# Patient Record
Sex: MALE | Race: WHITE | NOT HISPANIC OR LATINO | Employment: OTHER | URBAN - METROPOLITAN AREA
[De-identification: names, ages, dates, MRNs, and addresses within clinical notes are randomized per-mention and may not be internally consistent; named-entity substitution may affect disease eponyms.]

---

## 2017-01-03 DIAGNOSIS — Z72.0 NICOTINE ABUSE: Primary | ICD-10-CM

## 2017-01-03 RX ORDER — NICOTINE 21 MG/24HR
1 PATCH, TRANSDERMAL 24 HOURS TRANSDERMAL EVERY 24 HOURS
Qty: 30 PATCH | Refills: 0 | Status: SHIPPED | OUTPATIENT
Start: 2017-01-03 | End: 2021-05-26

## 2017-01-03 RX ORDER — NICOTINE 21 MG/24HR
1 PATCH, TRANSDERMAL 24 HOURS TRANSDERMAL EVERY 24 HOURS
Qty: 14 PATCH | Refills: 0 | Status: SHIPPED | OUTPATIENT
Start: 2017-01-03 | End: 2021-05-26

## 2019-05-21 ENCOUNTER — TRANSFERRED RECORDS (OUTPATIENT)
Dept: HEALTH INFORMATION MANAGEMENT | Facility: CLINIC | Age: 39
End: 2019-05-21

## 2019-05-21 LAB
CREAT SERPL-MCNC: 0.92 MG/DL (ref 0.72–1.25)
GFR SERPL CREATININE-BSD FRML MDRD: >60 ML/MIN/1.73M2
GLUCOSE SERPL-MCNC: 93 MG/DL (ref 70–105)
POTASSIUM SERPL-SCNC: 3.8 MMOL/L (ref 3.5–5.1)

## 2019-07-08 ENCOUNTER — TELEPHONE (OUTPATIENT)
Dept: ORTHOPEDICS | Facility: OTHER | Age: 39
End: 2019-07-08

## 2019-07-08 ENCOUNTER — OFFICE VISIT (OUTPATIENT)
Dept: ORTHOPEDICS | Facility: OTHER | Age: 39
End: 2019-07-08

## 2019-07-08 VITALS
BODY MASS INDEX: 24.41 KG/M2 | SYSTOLIC BLOOD PRESSURE: 104 MMHG | WEIGHT: 146.5 LBS | DIASTOLIC BLOOD PRESSURE: 56 MMHG | HEIGHT: 65 IN

## 2019-07-08 DIAGNOSIS — Z87.39 HISTORY OF CLOSED SHOULDER DISLOCATION: Primary | ICD-10-CM

## 2019-07-08 DIAGNOSIS — S43.432A TEAR OF LEFT GLENOID LABRUM, INITIAL ENCOUNTER: ICD-10-CM

## 2019-07-08 DIAGNOSIS — S42.292A HILL-SACHS LESION OF LEFT SHOULDER: ICD-10-CM

## 2019-07-08 PROCEDURE — 99204 OFFICE O/P NEW MOD 45 MIN: CPT | Performed by: PHYSICAL MEDICINE & REHABILITATION

## 2019-07-08 ASSESSMENT — MIFFLIN-ST. JEOR: SCORE: 1503.27

## 2019-07-08 NOTE — PROGRESS NOTES
Sports Medicine Clinic Visit    PCP: Kb Brown    CC: Patient presents with:  Left Shoulder - Pain      HPI:  Quirino Torres is a 39 year old male who is seen as a self referral.   He notes left shoulder pain that began 3-3.5 weeks ago when he was lifting bins out of his truck and putting them on shelving in his garage.  He felt his shoulder popped out.  He notes it locked up on him and had excruciating pain. He was seen in [place] the day of the injury in the ED.  He notes pain in the anterior shoulder, posterior shoulder, and deep in the shoulder.  He notes it has popped out multiple times since the injury.  He rates the pain at a 10/10 at its worst and a 0/10 currently.  Symptoms are relieved with activity avoidance. Symptoms are worsened by certain positions, reaching, and lifting. He endorses clicking, and instability.   He denies swelling, bruising, grinding, catching, locking, numbness and tingling.  Other treatment has included rest, ibuprofen.       Review of Systems:  Musculoskeletal: as above  Remainder of review of systems is negative including constitutional, eyes, ENT, CV, pulmonary, GI, , endocrine, skin, hematologic, and neurologic except as noted in HPI or medical history.    History reviewed. No pertinent past surgical/medical/family/social history other than as mentioned in HPI.    Patient Active Problem List   Diagnosis     Family history of cardiac arrest     Nicotine abuse     CARDIOVASCULAR SCREENING; LDL GOAL LESS THAN 160     No past medical history on file.  No past surgical history on file.  Family History   Problem Relation Age of Onset     Cardiovascular Father 49        Dad passed away due to a massive heart attack     Cardiovascular Maternal Grandmother      Cardiovascular Paternal Grandmother      Diabetes Maternal Grandfather      Social History     Socioeconomic History     Marital status: Single     Spouse name: Not on file     Number of children: Not on file      Years of education: Not on file     Highest education level: Not on file   Occupational History     Not on file   Social Needs     Financial resource strain: Not on file     Food insecurity:     Worry: Not on file     Inability: Not on file     Transportation needs:     Medical: Not on file     Non-medical: Not on file   Tobacco Use     Smoking status: Current Every Day Smoker     Packs/day: 1.00     Years: 20.00     Pack years: 20.00     Types: Cigarettes     Smokeless tobacco: Never Used   Substance and Sexual Activity     Alcohol use: No     Drug use: Yes     Comment: MARIJUANA     Sexual activity: Never   Lifestyle     Physical activity:     Days per week: Not on file     Minutes per session: Not on file     Stress: Not on file   Relationships     Social connections:     Talks on phone: Not on file     Gets together: Not on file     Attends Latter-day service: Not on file     Active member of club or organization: Not on file     Attends meetings of clubs or organizations: Not on file     Relationship status: Not on file     Intimate partner violence:     Fear of current or ex partner: Not on file     Emotionally abused: Not on file     Physically abused: Not on file     Forced sexual activity: Not on file   Other Topics Concern      Service No     Blood Transfusions No     Caffeine Concern No     Occupational Exposure No     Hobby Hazards No     Sleep Concern No     Stress Concern No     Weight Concern No     Special Diet No     Back Care Yes     Comment: Lower back pain due to job     Exercise No     Bike Helmet Yes     Comment: Motorcycle yes-pedal bike- no     Seat Belt Yes     Self-Exams Yes     Parent/sibling w/ CABG, MI or angioplasty before 65F 55M? Not Asked   Social History Narrative     Not on file       He works doing [employer] and [employer]. He owns his own company.     Current Outpatient Medications   Medication     meloxicam (MOBIC) 15 MG tablet     nicotine (NICODERM CQ) 14 MG/24HR 24 hr  "patch     nicotine (NICODERM CQ) 21 MG/24HR 24 hr patch     nicotine (NICODERM CQ) 7 MG/24HR 24 hr patch     NO ACTIVE MEDICATIONS     No current facility-administered medications for this visit.      Allergies   Allergen Reactions     No Known Drug Allergies          Objective:  /56   Ht 1.646 m (5' 4.8\")   Wt 66.5 kg (146 lb 8 oz)   BMI 24.53 kg/m      General: Alert and in no distress    Head: Normocephalic, atraumatic  Eyes: no scleral icterus or conjunctival erythema   Oropharynx:  Mucous membranes moist  Skin: no erythema, petechiae, or jaundice  CV: regular rhythm by palpation, 2+ distal pulses  Resp: normal respiratory effort without conversational dyspnea   Psych: normal mood and affect    Gait: Non-antalgic, appropriate coordination and balance   Neuro: Motor strength and sensation as noted below    Musculoskeletal:    Bilateral Shoulder exam    Inspection and Posture:       normal    Skin:        no visible deformities    Palpation:  -Tender over the left proximal biceps tendon insertion    ROM:        Full active ROM with flexion, extension, abduction, adduction, internal and external rotation bilaterally    Painful motions:  -Left shoulder abduction, flexion, external rotation, and internal rotation behind the back are painful    Strength:        shoulder shrug 5/5 bilaterally       shoulder abduction 5/5 bilaterally       shoulder flexion 5/5 bilaterally       shoulder internal rotation 5/5 bilaterally       shoulder external rotation 5/5 bilaterally       elbow flexion 5/5 bilaterally       elbow extension 5/5 bilaterally       forearm pronation 5/5 bilaterally       forearm supination 5/5 bilaterally       wrist flexion 5/5 bilaterally       wrist extension 5/5 bilaterally        strength 5/5 bilaterally       finger abduction 5/5 bilaterally    Sensation:        normal sensation over shoulder and upper extremity       Radiology:  Images are being pushed to PACS  MRI (MDIP) SHOULDER LT " W/O CONTRAST6/27/2019  [facility]   Result Impression   IMPRESSION:     1.  Evidence of recent fracture dislocation of the glenohumeral joint. Slight displacement of bone Bankart fracture involving the anterior inferior rim of the glenoid.  2.  Hill-Sachs lesion with marrow edema.  3.  Articular surfaces otherwise remain congruent.  4.  Acromioclavicular joint edema.  5.  Joint effusion, small loose bodies in synovial thickening in the inferior recess of the joint.  6.  Abnormal appearance of the superior, anterior and posterior glenoid labrum suspicious for intrinsic labral tear.  7.  Orthopedic referral recommended.    REPORT SIGNED BY DR. Georgi Zaragoza   Result Narrative   EXAMINATION: MRI SHOULDER LT W/O CON  6/27/2019 12:25 PM    CLINICAL DATA: M25.512 Pain in left shoulder. Assess for rotator cuff tear. Impingement suspected. Injury placing boxes 2 weeks ago with shooting pain    TECHNIQUE: Multiplanar T1, proton-density, fat sat proton-density, and T2 of the shoulder.    COMPARISON: None    FINDINGS:    Rotator Cuff:     Mild distal supraspinatus tendinosis. Remaining tendons intact and unremarkable. No muscle atrophy or tendon retraction.    Acromioclavicular Joint :    Acromioclavicular joint edema and hypertrophy. Slightly downsloping lateral margin of the type I acromion.    Glenohumeral Joint:     There is evidence of recent dislocation with bony Bankart fracture at the anterior inferior glenoid measuring 14 x 6 mm with mild displacement. Superficial Hill-Sachs lesion with marrow edema. Articular surfaces remain congruent.    Biceps/Labral Complex: Mild bicipital tendinosis without dislocation. Abnormal signal within the posterior and anterior labrum. Joint effusion. Synovial thickening and small loose bodies in the inferior recess of the joint.    Periarticular Tissues:     No acute periarticular soft tissue abnormality.               Assessment:  1. History of closed shoulder dislocation     2. Hill-Sachs lesion of left shoulder    3. Tear of left glenoid labrum, initial encounter        Plan:  Discussed the assessment with the patient and developed a plan together:  -Referral to an Orthopedic Surgeon - Dr. Reema Sanders.  -Rehab: Physical Therapy: [facility] - [phone]. Please do 5-6 days of exercises per week between formal sessions and the home exercises they provide.  -Ice or ice massage 15-20 minutes for pain relief or swelling as needed  -Continue meloxicam and Flexeril as desired. Taking Flexeril may cause sedation. Do not take prior to driving.  Please take Mobic with food.  DO NOT take any other nonsteroidal anti-inflammatory drugs (NSAIDs) such as Advil, ibuprofen, Aleve, naproxen, etc while on this medication.  -Avoid aggravating activities    Follow Up: With Dr Sanders. Please call with any questions or concerns.       Maegan Jolley MD, CAQ Sports Medicine  [facility]

## 2019-07-08 NOTE — LETTER
7/8/2019         RE: Quirino Torres  [address]  [place] [address]        Dear Colleague,    Thank you for referring your patient, Quirino Torres, to the [facility]. Please see a copy of my visit note below.    Sports Medicine Clinic Visit    PCP: Kb Brown    CC: Patient presents with:  Left Shoulder - Pain      HPI:  Quirino Torres is a 39 year old male who is seen as a self referral.   He notes left shoulder pain that began 3-3.5 weeks ago when he was lifting bins out of his truck and putting them on shelving in his garage.  He felt his shoulder popped out.  He notes it locked up on him and had excruciating pain. He was seen in [place] the day of the injury in the ED.  He notes pain in the anterior shoulder, posterior shoulder, and deep in the shoulder.  He notes it has popped out multiple times since the injury.  He rates the pain at a 10/10 at its worst and a 0/10 currently.  Symptoms are relieved with activity avoidance. Symptoms are worsened by certain positions, reaching, and lifting. He endorses clicking, and instability.   He denies swelling, bruising, grinding, catching, locking, numbness and tingling.  Other treatment has included rest, ibuprofen.       Review of Systems:  Musculoskeletal: as above  Remainder of review of systems is negative including constitutional, eyes, ENT, CV, pulmonary, GI, , endocrine, skin, hematologic, and neurologic except as noted in HPI or medical history.    History reviewed. No pertinent past surgical/medical/family/social history other than as mentioned in HPI.    Patient Active Problem List   Diagnosis     Family history of cardiac arrest     Nicotine abuse     CARDIOVASCULAR SCREENING; LDL GOAL LESS THAN 160     No past medical history on file.  No past surgical history on file.  Family History   Problem Relation Age of Onset     Cardiovascular Father 49        Dad passed away due to a massive heart attack      Cardiovascular Maternal Grandmother      Cardiovascular Paternal Grandmother      Diabetes Maternal Grandfather      Social History     Socioeconomic History     Marital status: Single     Spouse name: Not on file     Number of children: Not on file     Years of education: Not on file     Highest education level: Not on file   Occupational History     Not on file   Social Needs     Financial resource strain: Not on file     Food insecurity:     Worry: Not on file     Inability: Not on file     Transportation needs:     Medical: Not on file     Non-medical: Not on file   Tobacco Use     Smoking status: Current Every Day Smoker     Packs/day: 1.00     Years: 20.00     Pack years: 20.00     Types: Cigarettes     Smokeless tobacco: Never Used   Substance and Sexual Activity     Alcohol use: No     Drug use: Yes     Comment: MARIJUANA     Sexual activity: Never   Lifestyle     Physical activity:     Days per week: Not on file     Minutes per session: Not on file     Stress: Not on file   Relationships     Social connections:     Talks on phone: Not on file     Gets together: Not on file     Attends Latter-day service: Not on file     Active member of club or organization: Not on file     Attends meetings of clubs or organizations: Not on file     Relationship status: Not on file     Intimate partner violence:     Fear of current or ex partner: Not on file     Emotionally abused: Not on file     Physically abused: Not on file     Forced sexual activity: Not on file   Other Topics Concern      Service No     Blood Transfusions No     Caffeine Concern No     Occupational Exposure No     Hobby Hazards No     Sleep Concern No     Stress Concern No     Weight Concern No     Special Diet No     Back Care Yes     Comment: Lower back pain due to job     Exercise No     Bike Helmet Yes     Comment: Motorcycle yes-pedal bike- no     Seat Belt Yes     Self-Exams Yes     Parent/sibling w/ CABG, MI or angioplasty before 65F  "55M? Not Asked   Social History Narrative     Not on file       He works doing [employer] and [employer]. He owns his own company.     Current Outpatient Medications   Medication     meloxicam (MOBIC) 15 MG tablet     nicotine (NICODERM CQ) 14 MG/24HR 24 hr patch     nicotine (NICODERM CQ) 21 MG/24HR 24 hr patch     nicotine (NICODERM CQ) 7 MG/24HR 24 hr patch     NO ACTIVE MEDICATIONS     No current facility-administered medications for this visit.      Allergies   Allergen Reactions     No Known Drug Allergies          Objective:  /56   Ht 1.646 m (5' 4.8\")   Wt 66.5 kg (146 lb 8 oz)   BMI 24.53 kg/m       General: Alert and in no distress    Head: Normocephalic, atraumatic  Eyes: no scleral icterus or conjunctival erythema   Oropharynx:  Mucous membranes moist  Skin: no erythema, petechiae, or jaundice  CV: regular rhythm by palpation, 2+ distal pulses  Resp: normal respiratory effort without conversational dyspnea   Psych: normal mood and affect    Gait: Non-antalgic, appropriate coordination and balance   Neuro: Motor strength and sensation as noted below    Musculoskeletal:    Bilateral Shoulder exam    Inspection and Posture:       normal    Skin:        no visible deformities    Palpation:  -Tender over the left proximal biceps tendon insertion    ROM:        Full active ROM with flexion, extension, abduction, adduction, internal and external rotation bilaterally    Painful motions:  -Left shoulder abduction, flexion, external rotation, and internal rotation behind the back are painful    Strength:        shoulder shrug 5/5 bilaterally       shoulder abduction 5/5 bilaterally       shoulder flexion 5/5 bilaterally       shoulder internal rotation 5/5 bilaterally       shoulder external rotation 5/5 bilaterally       elbow flexion 5/5 bilaterally       elbow extension 5/5 bilaterally       forearm pronation 5/5 bilaterally       forearm supination 5/5 bilaterally       wrist flexion 5/5 bilaterally     "   wrist extension 5/5 bilaterally        strength 5/5 bilaterally       finger abduction 5/5 bilaterally    Sensation:        normal sensation over shoulder and upper extremity       Radiology:  Images are being pushed to PACS  MRI (MD) SHOULDER LT W/O CONTRAST6/27/2019  [facility]   Result Impression   IMPRESSION:     1.  Evidence of recent fracture dislocation of the glenohumeral joint. Slight displacement of bone Bankart fracture involving the anterior inferior rim of the glenoid.  2.  Hill-Sachs lesion with marrow edema.  3.  Articular surfaces otherwise remain congruent.  4.  Acromioclavicular joint edema.  5.  Joint effusion, small loose bodies in synovial thickening in the inferior recess of the joint.  6.  Abnormal appearance of the superior, anterior and posterior glenoid labrum suspicious for intrinsic labral tear.  7.  Orthopedic referral recommended.    REPORT SIGNED BY DR. Georgi Zaragoza   Result Narrative   EXAMINATION: MRI SHOULDER LT W/O CON  6/27/2019 12:25 PM    CLINICAL DATA: M25.512 Pain in left shoulder. Assess for rotator cuff tear. Impingement suspected. Injury placing boxes 2 weeks ago with shooting pain    TECHNIQUE: Multiplanar T1, proton-density, fat sat proton-density, and T2 of the shoulder.    COMPARISON: None    FINDINGS:    Rotator Cuff:     Mild distal supraspinatus tendinosis. Remaining tendons intact and unremarkable. No muscle atrophy or tendon retraction.    Acromioclavicular Joint :    Acromioclavicular joint edema and hypertrophy. Slightly downsloping lateral margin of the type I acromion.    Glenohumeral Joint:     There is evidence of recent dislocation with bony Bankart fracture at the anterior inferior glenoid measuring 14 x 6 mm with mild displacement. Superficial Hill-Sachs lesion with marrow edema. Articular surfaces remain congruent.    Biceps/Labral Complex: Mild bicipital tendinosis without dislocation. Abnormal signal within the posterior and  anterior labrum. Joint effusion. Synovial thickening and small loose bodies in the inferior recess of the joint.    Periarticular Tissues:     No acute periarticular soft tissue abnormality.               Assessment:  1. History of closed shoulder dislocation    2. Hill-Sachs lesion of left shoulder    3. Tear of left glenoid labrum, initial encounter        Plan:  Discussed the assessment with the patient and developed a plan together:  -Referral to an Orthopedic Surgeon - Dr. Reema Sanders.  -Rehab: Physical Therapy: [facility] - [phone]. Please do 5-6 days of exercises per week between formal sessions and the home exercises they provide.  -Ice or ice massage 15-20 minutes for pain relief or swelling as needed  -Continue meloxicam and Flexeril as desired. Taking Flexeril may cause sedation. Do not take prior to driving.  Please take Mobic with food.  DO NOT take any other nonsteroidal anti-inflammatory drugs (NSAIDs) such as Advil, ibuprofen, Aleve, naproxen, etc while on this medication.  -Avoid aggravating activities    Follow Up: With Dr Sanders. Please call with any questions or concerns.       Maegan Jolley MD, [facility] Sports Medicine  [facility]      Again, thank you for allowing me to participate in the care of your patient.        Sincerely,        Shira Jolley MD

## 2019-07-08 NOTE — PATIENT INSTRUCTIONS
Today's Plan of Care:  -Referral to an Orthopedic Surgeon - Dr. Reema Sanders. Scheduling will call you.   -Rehab: Physical Therapy: [facility] - [phone]. Please do 5-6 days of exercises per week between formal sessions and the home exercises they provide.  -Ice or ice massage 15-20 minutes for pain relief or swelling as needed  -Continue meloxicam and Flexeril as desired. Cyclobenzaprine (Flexeril). Taking this medication may cause sedation. Do not take prior to driving  -Please take Mobic with food.  DO NOT take any other nonsteroidal anti-inflammatory drugs (NSAIDs) such as Advil, ibuprofen, Aleve, naproxen, etc while on this medication.  -Avoid aggravating activities      Follow Up: With Dr Sanders. Please call with any questions or concerns.

## 2020-02-18 ENCOUNTER — NURSE TRIAGE (OUTPATIENT)
Dept: NURSING | Facility: CLINIC | Age: 40
End: 2020-02-18

## 2020-02-18 NOTE — TELEPHONE ENCOUNTER
"Calling about weird feeling in chest x 2 weeks.    Chest pain is happening once per minute.  Deep inside.  It lasts a few seconds.    Sob recently.  No cough, cold or fever.    Worse the last 2 days.    Also weird stomach nausea at times.    \"Gut pains\".    No insurance currently. Needs to be seen today UC or ED.    Patient going to nearest urgent care in [place] since he is there now.    Yamila Monique RN  [facility] Nurse Advisor      Additional Information    Negative: Severe difficulty breathing (e.g., struggling for each breath, speaks in single words)    Negative: Passed out (i.e., fainted, collapsed and was not responding)    Negative: Chest pain lasting longer than 5 minutes and ANY of the following:* Over 50 years old* Over 30 years old and at least one cardiac risk factor (i.e., high blood pressure, diabetes, high cholesterol, obesity, smoker or strong family history of heart disease)* Pain is crushing, pressure-like, or heavy * Took nitroglycerin and chest pain was not relieved* History of heart disease (i.e., angina, heart attack, bypass surgery, angioplasty, CHF)    Negative: Visible sweat on face or sweat dripping down face    Negative: Sounds like a life-threatening emergency to the triager    Negative: Followed an injury to chest    Negative: SEVERE chest pain    Negative: Pain also present in shoulder(s) or arm(s) or jaw    Negative: Difficulty breathing    Negative: Cocaine use within last 3 days    Negative: History of prior 'blood clot' in leg or lungs (i.e., deep vein thrombosis, pulmonary embolism)    Negative: Recent illness requiring prolonged bed rest (i.e., immobilization)    Negative: Hip or leg fracture in past 2 months (e.g, or had cast on leg or ankle)    Negative: Major surgery in the past month    Negative: Recent long-distance travel with prolonged time in car, bus, plane, or train (i.e., within past 2 weeks; 6 or more hours duration)    Negative: Heart beating " irregularly or very rapidly    Intermittent chest pain and pain has been increasing in severity or frequency    Negative: Chest pain lasting longer than 5 minutes    Protocols used: CHEST PAIN-A-OH

## 2021-05-25 NOTE — PROGRESS NOTES
Assessment & Plan     Cigarette nicotine dependence without complication  Nicotine abuse  He is interested in quitting, he would like to try the patches first and use gum as needed. We did discuss Chantix and Wellbutrin but he wants to wait on this, if he wants to start we did already discuss potential side effects and will send in Rx if needed.   - nicotine (NICODERM CQ) 21 MG/24HR 24 hr patch; Place 1 patch onto the skin every 24 hours  - nicotine (NICODERM CQ) 14 MG/24HR 24 hr patch; Place 1 patch onto the skin every 24 hours  - nicotine (NICODERM CQ) 7 MG/24HR 24 hr patch; Place 1 patch onto the skin every 24 hours  - SMOKING CESSATION COUNSELING 3-10 MIN  - nicotine (NICORETTE) 2 MG gum; Place 1 each (2 mg) inside cheek as needed for smoking cessation Place 1 each inside cheek as needed for smoking cessation.    Acute right-sided low back pain without sciatica  This is a newer issue but going on months with what sounds like some improvement.  Recommended imaging to evaluate as we have not had x-rays in the clinic.  Because of his occupation encouraged physical therapy as well which he is open to. If persistent pain or pending x-ray results would consider MRI but there are no radicular or neurologic symptoms at this time.  Testicular examination was performed with no pain on exam, no masses and no herniations at this time. No significant acute pain at this time to suggest need for medication management.   - XR Lumbar Spine 2/3 Views; Future  - XR Sacroiliac Joint G/E 3 Views; Future  - [facility] PT AND HAND REFERRAL; Future    Chronic left shoulder pain  He notes this as we talked about MRI potentially of back and he wants an MRI of the left shoulder.  He notes instability, this may need to be used with dye of the shoulder joint for labral assessment.  Will reach out to ortho/sports med prior to ordering imaging, did get x-ray today.   - XR Shoulder Left G/E 3 Views; Future    Screening for HIV (human immunodeficiency  "virus)  He will do labs in future  - HIV Antigen Antibody Combo; Future    Need for hepatitis C screening test  He will do labs in the future.   - Hepatitis C Screen Reflex to HCV RNA Quant and Genotype; Future    Screening for hyperlipidemia  He will do labs in the future.   - Lipid panel reflex to direct LDL Fasting; Future      Tobacco Cessation:   reports that he has been smoking cigarettes. He has a 26.00 pack-year smoking history. He has never used smokeless tobacco.  Tobacco Cessation Action Plan: Pharmacotherapies : Nicotine patch and other Nicotine replacement        Return in about 4 weeks (around 6/23/2021) for If not improving, sooner if worse or new concerns.    Options for treatment and follow-up care were reviewed with the patient and/or guardian. Patient and/or guardian engaged in the decision making process and verbalized understanding of the options discussed and agreed with the final plan.    ANGEL Gonzalez [facility] JUAN Win is a 41 year old who presents for the following health issues     HPI   Would like to discuss quitting smoking. Smokes about a 1 pack to 1.5 packs a day.     Back Pain  Onset/Duration: 5 months  Description:   Location of pain: low back right  Character of pain: depending on movement it can be sharp, makes his right testicle hurt.   Pain radiation: none  New numbness or weakness in legs, not attributed to pain: no   Intensity: Currently 0/10, At its worst 8-9/10  Progression of Symptoms: same  History:   Specific cause: awhile back, there were a few nights were he was sleeping on his stomach. 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   Pain interferes with job: YES  History of back problems: no prior back problems  Any previous MRI or X-rays: Yes--at chiropractor. X-rays.   Sees a specialist for back pain: No  Alleviating factors:   Improved by: none    Precipitating factors:  Worsened by: \"certain " "movement\"  Therapies tried and outcome: chiropractor a few times-maybe 3 times, cannot get his lower back to \"pop\", has had a massage, a spacer to wear under his right foot due to hips being out of line, heating pad.     - consistently had pain for the last 5 months.   - Chiropractor gave spacer for shoe because hip was slightly out of line.   - Right sided low back pain. Sometimes will radiate to the groin.   - Massage therapist felt a difference on the right side low back compared to the left.   - only certain movements will cause pain - bending some, position changes.  - he denies any numbness/tingling sensations.   - Denies any testicular masses or palpable pain. He notes the testicular pain is only on the right side and has improved with time as well.   - He also has had left shoulder pain, used to have right shoulder pain but now in the left. He notes having what sounds like dislocations a few times in the past years ago and feels like it is loose.     Accompanying Signs & Symptoms:  Risk of Fracture: None  Risk of Cauda Equina: None  Risk of Infection: None  Risk of Cancer: None  Risk of Ankylosing Spondylitis: Onset at age <35, male, AND morning back stiffness  no     Quitting smoking:  - Has smoked since he was 12/13 years old.   - Quit when ever he would be in [employer] when he was younger.   - Did quit once for a week cold turkey.   - hasn't used any products to quit smoking.     Review of Systems   Constitutional, cardiovascular, pulmonary, musculoskeletal, neuro, skin systems are negative, except as otherwise noted.      Objective    /72   Pulse 70   Temp 97.9  F (36.6  C) (Temporal)   Resp 16   Ht 1.685 m (5' 6.34\")   Wt 69.1 kg (152 lb 6.4 oz)   SpO2 99%   BMI 24.35 kg/m    Body mass index is 24.35 kg/m .  Physical Exam   GENERAL: healthy, alert and no distress  RESP: lungs clear to auscultation - no rales, rhonchi or wheezes  CV: regular rate and rhythm, normal S1 S2, no S3 or S4, no murmur, " click or rub, no peripheral edema and peripheral pulses strong   (male): normal male genitalia without lesions or urethral discharge, no hernia  MS: no gross musculoskeletal defects noted, no edema  LUMBAR SPINE - non-tender to palpation over the lumbar spinous processes, mildly tender to deep palpation over the right SI joint and mildly over the right lumbar paraspinal muscles.  Full active ROM of the lumbar spine with discomfort noted with R>L lateral bending. Negative SLR bilaterally.     HIP - full passive ROM of bilateral hips without pain, noted pain in right SI joint region with EVELYNE on left, negative with EVELYNE on right.    SKIN: no suspicious lesions or rashes  NEURO: Normal strength and tone, mentation intact and speech normal  PSYCH: mentation appears normal, affect normal/bright    No results found for any visits on 05/26/21.

## 2021-05-26 ENCOUNTER — ANCILLARY PROCEDURE (OUTPATIENT)
Dept: GENERAL RADIOLOGY | Facility: OTHER | Age: 41
End: 2021-05-26
Attending: PHYSICIAN ASSISTANT
Payer: COMMERCIAL

## 2021-05-26 ENCOUNTER — OFFICE VISIT (OUTPATIENT)
Dept: FAMILY MEDICINE | Facility: OTHER | Age: 41
End: 2021-05-26
Payer: COMMERCIAL

## 2021-05-26 VITALS
DIASTOLIC BLOOD PRESSURE: 72 MMHG | HEART RATE: 70 BPM | TEMPERATURE: 97.9 F | OXYGEN SATURATION: 99 % | WEIGHT: 152.4 LBS | BODY MASS INDEX: 24.49 KG/M2 | RESPIRATION RATE: 16 BRPM | HEIGHT: 66 IN | SYSTOLIC BLOOD PRESSURE: 100 MMHG

## 2021-05-26 DIAGNOSIS — Z72.0 NICOTINE ABUSE: ICD-10-CM

## 2021-05-26 DIAGNOSIS — Z11.59 NEED FOR HEPATITIS C SCREENING TEST: ICD-10-CM

## 2021-05-26 DIAGNOSIS — G89.29 CHRONIC LEFT SHOULDER PAIN: ICD-10-CM

## 2021-05-26 DIAGNOSIS — M54.50 ACUTE RIGHT-SIDED LOW BACK PAIN WITHOUT SCIATICA: ICD-10-CM

## 2021-05-26 DIAGNOSIS — M25.512 CHRONIC LEFT SHOULDER PAIN: ICD-10-CM

## 2021-05-26 DIAGNOSIS — F17.210 CIGARETTE NICOTINE DEPENDENCE WITHOUT COMPLICATION: Primary | ICD-10-CM

## 2021-05-26 DIAGNOSIS — Z13.220 SCREENING FOR HYPERLIPIDEMIA: ICD-10-CM

## 2021-05-26 DIAGNOSIS — Z11.4 SCREENING FOR HIV (HUMAN IMMUNODEFICIENCY VIRUS): ICD-10-CM

## 2021-05-26 PROCEDURE — 99214 OFFICE O/P EST MOD 30 MIN: CPT | Performed by: PHYSICIAN ASSISTANT

## 2021-05-26 PROCEDURE — 72100 X-RAY EXAM L-S SPINE 2/3 VWS: CPT | Performed by: RADIOLOGY

## 2021-05-26 PROCEDURE — 72202 X-RAY EXAM SI JOINTS 3/> VWS: CPT | Performed by: RADIOLOGY

## 2021-05-26 PROCEDURE — 73030 X-RAY EXAM OF SHOULDER: CPT | Mod: LT | Performed by: RADIOLOGY

## 2021-05-26 RX ORDER — NICOTINE 21 MG/24HR
1 PATCH, TRANSDERMAL 24 HOURS TRANSDERMAL EVERY 24 HOURS
Qty: 14 PATCH | Refills: 0 | Status: SHIPPED | OUTPATIENT
Start: 2021-05-26

## 2021-05-26 RX ORDER — NICOTINE 21 MG/24HR
1 PATCH, TRANSDERMAL 24 HOURS TRANSDERMAL EVERY 24 HOURS
Qty: 30 PATCH | Refills: 0 | Status: SHIPPED | OUTPATIENT
Start: 2021-05-26

## 2021-05-26 ASSESSMENT — MIFFLIN-ST. JEOR: SCORE: 1544.41

## 2021-05-26 NOTE — PATIENT INSTRUCTIONS
"    Nicotine Gum (Nicorette, polacrilex nicotine gum)      Dosing:    >25 cigarettes per day Dose   Weeks 1-6 Chew 1 piece of 4 mg gum every 1-2 hours. Maximum of 24 pieces a day.   Weeks 7-9 Chew 1 piece of 4 mg gum every 2-4 hours. Maximum of 24 pieces a day.   Weeks 10-12 Chew 1 piece of 4 mg gum every 4-8 hours. Maximum of 24 pieces a day.   < 25 cigarettes per day    Weeks 1-6 Chew 1 piece of 2 mg gum every 1-2 hours. Maximum of 24 pieces a day.   Weeks 7-9 Chew 1 piece of 2 mg gum every 2-4 hours. Maximum of 24 pieces a day.   Weeks 10-12 Chew 1 piece of 2 mg gum every 4-8 hours. Maximum of 24 pieces a day.     How to use nicotine gum:    Chew the gum slowly until you notice a tingly feeling or peppery taste.     Then \"park\" the gum between your teeth and your cheek and let it sit there until you don't notice the tingly feeling or taste anymore.     Chew the gum slowly again until you notice the tingly feeling or peppery taste again and \"park\" the gum on the other side of your mouth.     Repeat this process for 30 minutes, then throw the gum away.     Especially at the beginning, use the gum whenever you would normally smoke a cigarette.    Some tips:    Do not smoke while you are using nicotine gum.     Do not swallow the gum.     Do not chew the gum like normal gum--you will swallow the nicotine instead of absorbing it. You are also more likely to get indigestion or nausea.     Do not drink anything, including water, while you are chewing gum.     Avoid acidic drinks like coffee and pop right before chewing the gum.     As your body becomes less dependent on nicotine, you will need to chew less gum.     Follow up with your health care provider if you have any questions and also to help taper your nicotine gum dose.    Side Effects:  Some people may experience some indigestion or nausea. Using proper chewing technique may help. If you experience any other troublesome or unusual side effects, call your health " care provider.    Nicotine Patch    Dosing:    >10 cigarettes per day Dose   Weeks 1-6 Use one 21 mg patch per day.   Weeks 7-8 Use one 14 mg patch per day.   Weeks 9-10 Use one 7 mg patch per day   <10 cigarettes per day  Weeks 1-6 Use one 14 mg patch per day   Weeks 7-8 Use one 7 mg patch per day       How to use the Nicotine Patch:    Apply a new patch to non-hairy, clean, dry skin on the upper body or upper outer arm.  Remove backing from patch and press on skin.  Hold for 10 seconds.    Apply patch around the same time every day.    Wash your hands after applying the patch.    Remove the patch at the end of the day before you go to bed.    Apply a new patch the next morning.    Do not apply the patch to an area where you have worn a patch in the last week.   This will help prevent or reduce skin irritation.    Some Tips:    Do not smoke while you are using the nicotine patch!    Do not cut the nicotine patch -it will be ineffective.    Remove the patch prior to receiving an MRI since the patch may contain some metal.    Do not put the patch on irritated, cut, or burned skin.    If the patch falls off and cannot be reapplied, put on a new patch.    Follow -up with your health care professional if you have any questions and also to help taper your nicotine patch dose.    Side Effects:  Some people experience some skin irritation where the patch was placed.  Moving around the site where you are putting the patch each day should help.  If you experience any other troublesome or unusual side effects, call your health care professional.

## 2022-04-22 ENCOUNTER — TELEPHONE (OUTPATIENT)
Dept: FAMILY MEDICINE | Facility: CLINIC | Age: 42
End: 2022-04-22
Payer: COMMERCIAL

## 2022-04-22 NOTE — TELEPHONE ENCOUNTER
Reason for Call:  Other appointment    Detailed comments: Pt is trying to set up Physical and needs appt to be  earlier then July. The [facility] is allowing an July appt only. Pt wants to know can the clinic squeeze PT in earlier    Phone Number Patient can be reached at: Work number on file:[phone]  Best Time: anytime    Can we leave a detailed message on this number? YES    Call taken on 4/22/2022 at 4:44 PM by Tracey Rivera

## 2022-06-04 ENCOUNTER — HEALTH MAINTENANCE LETTER (OUTPATIENT)
Age: 42
End: 2022-06-04

## 2022-06-15 ENCOUNTER — OFFICE VISIT (OUTPATIENT)
Dept: FAMILY MEDICINE | Facility: OTHER | Age: 42
End: 2022-06-15
Payer: COMMERCIAL

## 2022-06-15 ENCOUNTER — TELEPHONE (OUTPATIENT)
Dept: FAMILY MEDICINE | Facility: OTHER | Age: 42
End: 2022-06-15

## 2022-06-15 VITALS
TEMPERATURE: 97.9 F | HEART RATE: 59 BPM | DIASTOLIC BLOOD PRESSURE: 84 MMHG | WEIGHT: 144.5 LBS | HEIGHT: 65 IN | RESPIRATION RATE: 18 BRPM | SYSTOLIC BLOOD PRESSURE: 110 MMHG | OXYGEN SATURATION: 99 % | BODY MASS INDEX: 24.07 KG/M2

## 2022-06-15 DIAGNOSIS — Z00.00 ROUTINE GENERAL MEDICAL EXAMINATION AT A HEALTH CARE FACILITY: Primary | ICD-10-CM

## 2022-06-15 DIAGNOSIS — Z11.4 SCREENING FOR HIV (HUMAN IMMUNODEFICIENCY VIRUS): ICD-10-CM

## 2022-06-15 DIAGNOSIS — Z71.6 ENCOUNTER FOR SMOKING CESSATION COUNSELING: ICD-10-CM

## 2022-06-15 DIAGNOSIS — Z13.220 SCREENING FOR HYPERLIPIDEMIA: ICD-10-CM

## 2022-06-15 DIAGNOSIS — Z11.59 NEED FOR HEPATITIS C SCREENING TEST: ICD-10-CM

## 2022-06-15 LAB
ALBUMIN SERPL-MCNC: 4.2 G/DL (ref 3.4–5)
ALP SERPL-CCNC: 81 U/L (ref 40–150)
ALT SERPL W P-5'-P-CCNC: 22 U/L (ref 0–70)
ANION GAP SERPL CALCULATED.3IONS-SCNC: 3 MMOL/L (ref 3–14)
AST SERPL W P-5'-P-CCNC: 17 U/L (ref 0–45)
BILIRUB SERPL-MCNC: 0.4 MG/DL (ref 0.2–1.3)
BUN SERPL-MCNC: 20 MG/DL (ref 7–30)
CALCIUM SERPL-MCNC: 8.7 MG/DL (ref 8.5–10.1)
CHLORIDE BLD-SCNC: 107 MMOL/L (ref 94–109)
CHOLEST SERPL-MCNC: 223 MG/DL
CO2 SERPL-SCNC: 28 MMOL/L (ref 20–32)
CREAT SERPL-MCNC: 0.87 MG/DL (ref 0.66–1.25)
ERYTHROCYTE [DISTWIDTH] IN BLOOD BY AUTOMATED COUNT: 14.1 % (ref 10–15)
FASTING STATUS PATIENT QL REPORTED: YES
GFR SERPL CREATININE-BSD FRML MDRD: >90 ML/MIN/1.73M2
GLUCOSE BLD-MCNC: 103 MG/DL (ref 70–99)
HCT VFR BLD AUTO: 47.8 % (ref 40–53)
HDLC SERPL-MCNC: 50 MG/DL
HGB BLD-MCNC: 16.4 G/DL (ref 13.3–17.7)
LDLC SERPL CALC-MCNC: 149 MG/DL
MCH RBC QN AUTO: 31.9 PG (ref 26.5–33)
MCHC RBC AUTO-ENTMCNC: 34.3 G/DL (ref 31.5–36.5)
MCV RBC AUTO: 93 FL (ref 78–100)
NONHDLC SERPL-MCNC: 173 MG/DL
PLATELET # BLD AUTO: 368 10E3/UL (ref 150–450)
POTASSIUM BLD-SCNC: 4.3 MMOL/L (ref 3.4–5.3)
PROT SERPL-MCNC: 7.7 G/DL (ref 6.8–8.8)
RBC # BLD AUTO: 5.14 10E6/UL (ref 4.4–5.9)
SODIUM SERPL-SCNC: 138 MMOL/L (ref 133–144)
TRIGL SERPL-MCNC: 122 MG/DL
WBC # BLD AUTO: 5.6 10E3/UL (ref 4–11)

## 2022-06-15 PROCEDURE — 99396 PREV VISIT EST AGE 40-64: CPT | Performed by: FAMILY MEDICINE

## 2022-06-15 PROCEDURE — 85027 COMPLETE CBC AUTOMATED: CPT | Performed by: FAMILY MEDICINE

## 2022-06-15 PROCEDURE — 80061 LIPID PANEL: CPT | Performed by: FAMILY MEDICINE

## 2022-06-15 PROCEDURE — 80053 COMPREHEN METABOLIC PANEL: CPT | Performed by: FAMILY MEDICINE

## 2022-06-15 PROCEDURE — 36415 COLL VENOUS BLD VENIPUNCTURE: CPT | Performed by: FAMILY MEDICINE

## 2022-06-15 ASSESSMENT — ENCOUNTER SYMPTOMS
HEMATURIA: 0
COUGH: 0
ARTHRALGIAS: 0
HEMATOCHEZIA: 0
CONSTIPATION: 0
JOINT SWELLING: 0
CHILLS: 0
NAUSEA: 0
DYSURIA: 0
FEVER: 0
PARESTHESIAS: 0
HEADACHES: 0
DIARRHEA: 0
MYALGIAS: 0
DIZZINESS: 0
WEAKNESS: 0
SORE THROAT: 0
SHORTNESS OF BREATH: 0
ABDOMINAL PAIN: 0
HEARTBURN: 0
NERVOUS/ANXIOUS: 0
PALPITATIONS: 0
FREQUENCY: 0
EYE PAIN: 0

## 2022-06-15 ASSESSMENT — PAIN SCALES - GENERAL: PAINLEVEL: NO PAIN (0)

## 2022-06-15 NOTE — PROGRESS NOTES
SUBJECTIVE:   CC: Quirino Torres is an 42 year old male who presents for preventative health visit.       Patient has been advised of split billing requirements and indicates understanding: Yes  Healthy Habits:     Getting at least 3 servings of Calcium per day:  Yes    Bi-annual eye exam:  NO    Dental care twice a year:  NO    Sleep apnea or symptoms of sleep apnea:  Sleep apnea    Diet:  Regular (no restrictions)    Frequency of exercise:  None    Taking medications regularly:  Yes    Medication side effects:  None    PHQ-2 Total Score: 0    Additional concerns today:  No    Would like to have his blood work checked out today.  Check his heart.    Still smoking.  Has tried patches in the past.  Does OK with these.      Works doing concrete.          Today's PHQ-2 Score:   PHQ-2 ( 1999 Pfizer) 6/15/2022   Q1: Little interest or pleasure in doing things 0   Q2: Feeling down, depressed or hopeless 0   PHQ-2 Score 0   PHQ-2 Total Score (12-17 Years)- Positive if 3 or more points; Administer PHQ-A if positive -   Q1: Little interest or pleasure in doing things Not at all   Q2: Feeling down, depressed or hopeless Not at all   PHQ-2 Score 0       Abuse: Current or Past(Physical, Sexual or Emotional)- No  Do you feel safe in your environment? Yes    Have you ever done Advance Care Planning? (For example, a Health Directive, POLST, or a discussion with a medical provider or your loved ones about your wishes): No, advance care planning information given to patient to review.  Patient declined advance care planning discussion at this time.    Social History     Tobacco Use     Smoking status: Current Every Day Smoker     Packs/day: 1.00     Years: 26.00     Pack years: 26.00     Types: Cigarettes     Smokeless tobacco: Never Used   Substance Use Topics     Alcohol use: No     If you drink alcohol do you typically have >3 drinks per day or >7 drinks per week? Not applicable    Alcohol Use 6/15/2022   Prescreen: >3  drinks/day or >7 drinks/week? No   Prescreen: >3 drinks/day or >7 drinks/week? -       Last PSA: No results found for: PSA    Reviewed orders with patient. Reviewed health maintenance and updated orders accordingly - Yes  BP Readings from Last 3 Encounters:   06/15/22 110/84   05/26/21 100/72   07/08/19 104/56    Wt Readings from Last 3 Encounters:   06/15/22 65.5 kg (144 lb 8 oz)   05/26/21 69.1 kg (152 lb 6.4 oz)   07/08/19 66.5 kg (146 lb 8 oz)                  Patient Active Problem List   Diagnosis     Family history of cardiac arrest     Nicotine abuse     CARDIOVASCULAR SCREENING; LDL GOAL LESS THAN 160     Past Surgical History:   Procedure Laterality Date     CLOSED RX NOSE FRACTURE N/A        Social History     Tobacco Use     Smoking status: Current Every Day Smoker     Packs/day: 1.00     Years: 26.00     Pack years: 26.00     Types: Cigarettes     Smokeless tobacco: Never Used   Substance Use Topics     Alcohol use: No     Family History   Problem Relation Age of Onset     Cardiovascular Father 49        Dad passed away due to a massive heart attack     Cardiovascular Maternal Grandmother      Cardiovascular Paternal Grandmother      Diabetes Maternal Grandfather          Current Outpatient Medications   Medication Sig Dispense Refill     nicotine (NICODERM CQ) 14 MG/24HR 24 hr patch Place 1 patch onto the skin every 24 hours (Patient not taking: Reported on 6/15/2022) 14 patch 0     nicotine (NICODERM CQ) 21 MG/24HR 24 hr patch Place 1 patch onto the skin every 24 hours (Patient not taking: Reported on 6/15/2022) 30 patch 0     Allergies   Allergen Reactions     No Known Drug Allergies      Recent Labs   Lab Test 05/21/19  0000   CR 0.92   GFRESTIMATED >60   POTASSIUM 3.8        Reviewed and updated as needed this visit by clinical staff   Tobacco  Allergies  Meds   Med Hx  Surg Hx  Fam Hx  Soc Hx          Reviewed and updated as needed this visit by Provider                       Review of  "Systems   Constitutional: Negative for chills and fever.   HENT: Negative for congestion, ear pain, hearing loss and sore throat.    Eyes: Negative for pain and visual disturbance.   Respiratory: Negative for cough and shortness of breath.    Cardiovascular: Negative for chest pain, palpitations and peripheral edema.   Gastrointestinal: Negative for abdominal pain, constipation, diarrhea, heartburn, hematochezia and nausea.   Genitourinary: Negative for dysuria, frequency, genital sores, hematuria, impotence, penile discharge and urgency.   Musculoskeletal: Negative for arthralgias, joint swelling and myalgias.   Skin: Negative for rash.   Neurological: Negative for dizziness, weakness, headaches and paresthesias.   Psychiatric/Behavioral: Negative for mood changes. The patient is not nervous/anxious.          OBJECTIVE:   /84 (Cuff Size: Adult Regular)   Pulse 59   Temp 97.9  F (36.6  C) (Temporal)   Resp 18   Ht 1.651 m (5' 5\")   Wt 65.5 kg (144 lb 8 oz)   SpO2 99%   BMI 24.05 kg/m      Physical Exam  GENERAL: healthy, alert and no distress  EYES: Eyes grossly normal to inspection, PERRL and conjunctivae and sclerae normal  HENT: ear canals and TM's normal, nose and mouth without ulcers or lesions  NECK: no adenopathy, no asymmetry, masses, or scars and thyroid normal to palpation  RESP: lungs clear to auscultation - no rales, rhonchi or wheezes  CV: regular rate and rhythm, normal S1 S2, no S3 or S4, no murmur, click or rub, no peripheral edema and peripheral pulses strong  ABDOMEN: soft, nontender, no hepatosplenomegaly, no masses and bowel sounds normal  MS: no gross musculoskeletal defects noted, no edema  SKIN: no suspicious lesions or rashes  NEURO: Normal strength and tone, mentation intact and speech normal  PSYCH: mentation appears normal, affect normal/bright    Diagnostic Test Results:  Labs reviewed in Epic  No results found for this or any previous visit (from the past 24 hour(s)).  No " results found for any visits on 06/15/22.    ASSESSMENT/PLAN:       ICD-10-CM    1. Routine general medical examination at a health care facility  Z00.00 Lipid panel reflex to direct LDL Non-fasting     Comprehensive metabolic panel (BMP + Alb, Alk Phos, ALT, AST, Total. Bili, TP)     CBC with platelets     Lipid panel reflex to direct LDL Non-fasting     Comprehensive metabolic panel (BMP + Alb, Alk Phos, ALT, AST, Total. Bili, TP)     CBC with platelets   2. Encounter for smoking cessation counseling  Z71.6 nicotine polacrilex (NICORETTE) 4 MG gum     nicotine (NICOTROL) 10 MG inhaler   3. Screening for HIV (human immunodeficiency virus)  Z11.4    4. Need for hepatitis C screening test  Z11.59    5. Screening for hyperlipidemia  Z13.220      1.  Reviewed recommended screenings and ordered appropriate testing for pt's risks and per pt's request(s).   2.  Discussed various options for smoking cessation.  Patient wished to have multiple options for nicotine replacement.  Discussed that he should not use these concurrently.  But I am fine having all of these at his disposal.  Encouraged his efforts at smoking cessation and hopefully continue to follow and assist as able.  3, 4.  Patient declined these test today.  5.  Screening ordered.    Portions of this note were completed using Dragon dictation software.  Although reviewed, there may be typographical and other inadvertent errors that remain.         COUNSELING:   Reviewed preventive health counseling, as reflected in patient instructions       Regular exercise       Healthy diet/nutrition       Immunizations    Declined: vaccines due to Concerns about side effects/safety               Consider Hep C screening for all patients one time for ages 18-79 years       HIV screeninx in teen years, 1x in adult years, and at intervals if high risk       Osteoporosis prevention/bone health       The ASCVD Risk score (Kam CAROLYN Jr., et al., 2013) failed to calculate for the  "following reasons:    Cannot find a previous HDL lab    Cannot find a previous total cholesterol lab    Estimated body mass index is 24.05 kg/m  as calculated from the following:    Height as of this encounter: 1.651 m (5' 5\").    Weight as of this encounter: 65.5 kg (144 lb 8 oz).         He reports that he has been smoking cigarettes. He has a 26.00 pack-year smoking history. He has never used smokeless tobacco.  Tobacco Cessation Action Plan:   Pharmacotherapies : other Nicotine replacement      Counseling Resources:  ATP IV Guidelines  Pooled Cohorts Equation Calculator  FRAX Risk Assessment  ICSI Preventive Guidelines  Dietary Guidelines for Americans, 2010  USDA's MyPlate  ASA Prophylaxis  Lung CA Screening    Steve Da Silva MD, MD  [facility]  "

## 2022-06-15 NOTE — RESULT ENCOUNTER NOTE
Quirino,    All of your labs were normal for you except your cholesterol and blood sugar.  For your cholesterol, I would encourage you to try to reduce saturated fats in your diet, continue to be active, and continue to maintain a healthy weight.  Eventually, we will need to consider cholesterol medication to reduce this if it is not coming down over time.  That would help to reduce your risk of heart disease.    Your blood sugar was just barely in the prediabetic range.  We should recheck this annually.  Continue to avoid concentrated carbohydrates to help prevent this from worsening.    Have a nice day!    Dr. Da Silva

## 2022-06-15 NOTE — PATIENT INSTRUCTIONS
Good luck quitting smoking.    If the nicotine replacement products don't work well for you, I would encourage you to consider Wellbutrin or Chantix.      We'll let you know your lab results as soon as we can.     I do strongly recommend Covid vaccination.  The risks of getting Covid greatly exceed the risks of vaccination.  Let me know what questions and concerns you have.     Contact us or return if questions or concerns.     Have a nice day!    Dr. Da Silva     Preventive Health Recommendations  Male Ages 40 to 49    Yearly exam:             See your health care provider every year in order to  o   Review health changes.   o   Discuss preventive care.    o   Review your medicines if your doctor has prescribed any.  You should be tested each year for STDs (sexually transmitted diseases) if you re at risk.   Have a cholesterol test every 5 years.   Have a colonoscopy (test for colon cancer) if someone in your family has had colon cancer or polyps before age 50.   After age 45, have a diabetes test (fasting glucose). If you are at risk for diabetes, you should have this test every 3 years.    Talk with your health care provider about whether or not a prostate cancer screening test (PSA) is right for you.    Shots: Get a flu shot each year. Get a tetanus shot every 10 years.     Nutrition:  Eat at least 5 servings of fruits and vegetables daily.   Eat whole-grain bread, whole-wheat pasta and brown rice instead of white grains and rice.   Get adequate Calcium and Vitamin D.     Lifestyle  Exercise for at least 150 minutes a week (30 minutes a day, 5 days a week). This will help you control your weight and prevent disease.   Limit alcohol to one drink per day.   No smoking.   Wear sunscreen to prevent skin cancer.   See your dentist every six months for an exam and cleaning.

## 2022-06-17 NOTE — TELEPHONE ENCOUNTER
Please notify patient that his insurance will not cover the Nicotrol inhaler until he has failed 2 different forms of smoking cessation medications.  The the next medication they would require would be Wellbutrin.  If he wishes to pursue that, let me know.  Otherwise, he can try one of the other nicotine replacement products we discussed.

## 2022-06-17 NOTE — TELEPHONE ENCOUNTER
Central Prior Authorization Team   Phone: [phone]      PRIOR AUTHORIZATION DENIED    Medication: nicotine (NICOTROL) 10 MG inhaler - EPA DENIED    Denial Date:  06/15/2022    Denial Rational: The patient needs to try/fail a total of 2 preferred drug, the patient will need try one more: Bupropion SR. If the patient cannot, the provider needs to provide medical necessity.           Appeal Information: If the Provider/Patient would like to appeal this denial, please provide a letter of medical necessity explaining why Preferred Formulary medications are not appropriate in the treatment of the patient's condition; along with any previous therapies tried/failed.    Once completed, please route back to me directly: Shahida Story

## 2022-10-09 ENCOUNTER — HEALTH MAINTENANCE LETTER (OUTPATIENT)
Age: 42
End: 2022-10-09

## 2023-04-20 ENCOUNTER — OFFICE VISIT (OUTPATIENT)
Dept: FAMILY MEDICINE | Facility: OTHER | Age: 43
End: 2023-04-20
Payer: COMMERCIAL

## 2023-04-20 VITALS
RESPIRATION RATE: 18 BRPM | HEIGHT: 65 IN | WEIGHT: 152 LBS | SYSTOLIC BLOOD PRESSURE: 110 MMHG | DIASTOLIC BLOOD PRESSURE: 66 MMHG | BODY MASS INDEX: 25.33 KG/M2 | TEMPERATURE: 97.5 F | HEART RATE: 64 BPM | OXYGEN SATURATION: 95 %

## 2023-04-20 DIAGNOSIS — G89.29 CHRONIC RIGHT-SIDED LOW BACK PAIN WITHOUT SCIATICA: ICD-10-CM

## 2023-04-20 DIAGNOSIS — M54.50 ACUTE MIDLINE LOW BACK PAIN WITHOUT SCIATICA: Primary | ICD-10-CM

## 2023-04-20 DIAGNOSIS — Z72.0 NICOTINE ABUSE: ICD-10-CM

## 2023-04-20 DIAGNOSIS — M54.50 CHRONIC RIGHT-SIDED LOW BACK PAIN WITHOUT SCIATICA: ICD-10-CM

## 2023-04-20 DIAGNOSIS — Z23 NEED FOR VACCINATION: ICD-10-CM

## 2023-04-20 PROCEDURE — 90471 IMMUNIZATION ADMIN: CPT | Performed by: FAMILY MEDICINE

## 2023-04-20 PROCEDURE — 90715 TDAP VACCINE 7 YRS/> IM: CPT | Performed by: FAMILY MEDICINE

## 2023-04-20 PROCEDURE — 99214 OFFICE O/P EST MOD 30 MIN: CPT | Mod: 25 | Performed by: FAMILY MEDICINE

## 2023-04-20 RX ORDER — CYCLOBENZAPRINE HCL 10 MG
10 TABLET ORAL 3 TIMES DAILY PRN
Qty: 30 TABLET | Refills: 1 | Status: SHIPPED | OUTPATIENT
Start: 2023-04-20

## 2023-04-20 RX ORDER — BUPROPION HYDROCHLORIDE 150 MG/1
150 TABLET ORAL EVERY MORNING
Qty: 90 TABLET | Refills: 1 | Status: SHIPPED | OUTPATIENT
Start: 2023-04-20

## 2023-04-20 RX ORDER — MELOXICAM 15 MG/1
7.5-15 TABLET ORAL DAILY PRN
Qty: 30 TABLET | Refills: 1 | Status: SHIPPED | OUTPATIENT
Start: 2023-04-20

## 2023-04-20 ASSESSMENT — ENCOUNTER SYMPTOMS: BACK PAIN: 1

## 2023-04-20 ASSESSMENT — PAIN SCALES - GENERAL: PAINLEVEL: MILD PAIN (2)

## 2023-04-20 NOTE — PROGRESS NOTES
"  Assessment & Plan       ICD-10-CM    1. Acute midline low back pain without sciatica  M54.50 Physical Therapy Referral     MR Lumbar Spine w/o Contrast     meloxicam (MOBIC) 15 MG tablet     cyclobenzaprine (FLEXERIL) 10 MG tablet      2. Chronic right-sided low back pain without sciatica  M54.50 Physical Therapy Referral    G89.29 MR Lumbar Spine w/o Contrast     meloxicam (MOBIC) 15 MG tablet     cyclobenzaprine (FLEXERIL) 10 MG tablet      3. Nicotine abuse  Z72.0 buPROPion (WELLBUTRIN XL) 150 MG 24 hr tablet      4. Need for vaccination  Z23 TDAP VACCINE (Adacel, Boostrix)  [[id number]]           1, 2.  Recommended trial of PT.  It not improving, will obtain MRI.  Also trial of nsaids and muscle relaxants to help with symptoms.  Follow up if not improving.  3.  Discuss options for quitting smoking.  Pt was interested in trying Wellbutrin.  Can use otc nicotine replacement products as well if needed.  4.  Immunized.          Ordering of each unique test  Prescription drug management  30 minutes spent by me on the date of the encounter doing chart review, history and exam, documentation and further activities per the note       BMI:   Estimated body mass index is 25.29 kg/m  as calculated from the following:    Height as of this encounter: 1.651 m (5' 5\").    Weight as of this encounter: 68.9 kg (152 lb).   Weight management plan: Discussed healthy diet and exercise guidelines    Patient Instructions   Thank you for visiting [facility]    Let's do some PT.  OK to proceed with MRI if not improving.    Can also use meloxicam daily and occasional flexeril to help with your symptoms.  Don't drive or operate equipment on flexeril until you know how you respond to it.    Try the Wellbutrin to help quit smoking.  If helpful, but not enough, we can increase your dose to 300 mg daily.      Contact us or return if questions or concerns.     Have a nice day!    Dr. Da Silva     Return in about 3 months (around " 7/20/2023), or if symptoms worsen or fail to improve, for Physical Exam.      If you need medication refills, please contact your pharmacy 3 days before your prescriptions runs out or download the [facility] kiara for your smart phone. If you are out of refills, your pharmacy will contact contact the clinic.                                     [employer] [phone]                    Steve Da Silva MD, MD  [facility] MARILY Win is a 43 year old, presenting for the following health issues:  Back Pain         View : No data to display.              Back Pain   This is a recurrent problem. The current episode started more than 2 days ago. The pain is associated with no known injury. The pain is present in the lumbar spine. He has tried heat for the symptoms.   History of Present Illness       Back Pain:  He presents for follow up of back pain. Patient's back pain is a recurring problem.  Location of back pain:  Right lower back, left lower back and left shoulder  Description of back pain: sharp  Back pain spreads: nowhere    Since patient first noticed back pain, pain is: always present, but gets better and worse  Does back pain interfere with his job:  Yes  Has the patient tried any new treatments:  Yes If Yes, which: Chiropractor, heat and massage      He eats 0-1 servings of fruits and vegetables daily.He consumes 1 sweetened beverage(s) daily.He exercises with enough effort to increase his heart rate 30 to 60 minutes per day.  He exercises with enough effort to increase his heart rate 5 days per week. He is missing 7 dose(s) of medications per week.     Pt developed back pain about 1-1.5 years ago (on chart review 2 years ago).  Thinks he just slept wrong one night.  He did chiropractic for a few times.  Couldn't really get this to improve.  Last time, had some pain from the adjustment, so he stopped going.  Had been managing this with massage since  "then.    He was sitting in the garage on Monday, and got up to get his daughter from [employer].  Had significant worsening of his pain.  Yesterday, had a massage and then went to the chiropractor.  Was bale to get it to \"pop\".  After that, he felt better.  Upon awakening, his symptoms were worse.      He's had back issues \"here and there\".  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      Last images from 2021 showed only some disc decrease, but otherwise normal.    He never did PT.  Was just \"too busy\".      Pt has been favoring his right side since his pain 2 years ago.      Pain is midline, not radiating down his legs.  Worse with forward flexion.      Not really improved with anything.  Not really painful while lying down.  Bending and other movement tend to worsen it.    Ibuprofen last night didn't clearly help.      Sleeps OK.      Currently using nicotine pouches to help  Quit smoking.          Review of Systems   Musculoskeletal: Positive for back pain.      Constitutional, HEENT, cardiovascular, pulmonary, gi and gu systems are negative, except as otherwise noted.      Objective    /66 (BP Location: Left arm, Patient Position: Sitting, Cuff Size: Adult Regular)   Pulse 64   Temp 97.5  F (36.4  C) (Temporal)   Resp 18   Ht 1.651 m (5' 5\")   Wt 68.9 kg (152 lb)   SpO2 95%   BMI 25.29 kg/m    Body mass index is 25.29 kg/m .  Physical Exam   GENERAL: healthy, alert and no distress  HENT: normal cephalic/atraumatic, right ear: tympanosclerosis, left ear: clear effusion and tympanosclerosis, nose and mouth without ulcers or lesions, oropharynx clear and oral mucous membranes moist  NECK: no adenopathy, no asymmetry, masses, or scars and thyroid normal to palpation  RESP: lungs clear to auscultation - no rales, rhonchi or wheezes  CV: regular rate and rhythm, normal S1 S2, no S3 or S4, no murmur, click or rub, no peripheral edema and peripheral pulses strong  ABDOMEN: soft, nontender, no " hepatosplenomegaly, no masses and bowel sounds normal  MS: midline back pain, but minimal tenderness.  paraspinous muscle spasms.      Office Visit on 06/15/2022   Component Date Value Ref Range Status     Cholesterol 06/15/2022 223 (H)  <200 mg/dL Final     Triglycerides 06/15/2022 122  <150 mg/dL Final     Direct Measure HDL 06/15/2022 50  >=40 mg/dL Final     LDL Cholesterol Calculated 06/15/2022 149 (H)  <=100 mg/dL Final     Non HDL Cholesterol 06/15/2022 173 (H)  <130 mg/dL Final     Patient Fasting > 8hrs? 06/15/2022 Yes   Final     Sodium 06/15/2022 138  133 - 144 mmol/L Final     Potassium 06/15/2022 4.3  3.4 - 5.3 mmol/L Final     Chloride 06/15/2022 107  94 - 109 mmol/L Final     Carbon Dioxide (CO2) 06/15/2022 28  20 - 32 mmol/L Final     Anion Gap 06/15/2022 3  3 - 14 mmol/L Final     Urea Nitrogen 06/15/2022 20  7 - 30 mg/dL Final     Creatinine 06/15/2022 0.87  0.66 - 1.25 mg/dL Final     Calcium 06/15/2022 8.7  8.5 - 10.1 mg/dL Final     Glucose 06/15/2022 103 (H)  70 - 99 mg/dL Final     Alkaline Phosphatase 06/15/2022 81  40 - 150 U/L Final     AST 06/15/2022 17  0 - 45 U/L Final     ALT 06/15/2022 22  0 - 70 U/L Final     Protein Total 06/15/2022 7.7  6.8 - 8.8 g/dL Final     Albumin 06/15/2022 4.2  3.4 - 5.0 g/dL Final     Bilirubin Total 06/15/2022 0.4  0.2 - 1.3 mg/dL Final     GFR Estimate 06/15/2022 >90  >60 mL/min/1.73m2 Final    Effective December 21, 2021 eGFRcr in adults is calculated using the 2021 CKD-EPI creatinine equation which includes age and gender (Mary fernandez al., NEJ, DOI: 10.1056/JTRLuv5181675)     WBC Count 06/15/2022 5.6  4.0 - 11.0 10e3/uL Final     RBC Count 06/15/2022 5.14  4.40 - 5.90 10e6/uL Final     Hemoglobin 06/15/2022 16.4  13.3 - 17.7 g/dL Final     Hematocrit 06/15/2022 47.8  40.0 - 53.0 % Final     MCV 06/15/2022 93  78 - 100 fL Final     MCH 06/15/2022 31.9  26.5 - 33.0 pg Final     MCHC 06/15/2022 34.3  31.5 - 36.5 g/dL Final     RDW 06/15/2022 14.1  10.0 - 15.0  % Final     Platelet Count 06/15/2022 368  150 - 450 10e3/uL Final     No results found for this visit on 04/20/23.

## 2023-04-27 ENCOUNTER — HOSPITAL ENCOUNTER (OUTPATIENT)
Dept: MRI IMAGING | Facility: CLINIC | Age: 43
Discharge: HOME | End: 2023-04-27
Attending: FAMILY MEDICINE | Admitting: FAMILY MEDICINE
Payer: COMMERCIAL

## 2023-04-27 DIAGNOSIS — G89.29 CHRONIC RIGHT-SIDED LOW BACK PAIN WITHOUT SCIATICA: ICD-10-CM

## 2023-04-27 DIAGNOSIS — M54.50 ACUTE MIDLINE LOW BACK PAIN WITHOUT SCIATICA: ICD-10-CM

## 2023-04-27 DIAGNOSIS — M54.50 CHRONIC RIGHT-SIDED LOW BACK PAIN WITHOUT SCIATICA: ICD-10-CM

## 2023-04-27 PROCEDURE — 72148 MRI LUMBAR SPINE W/O DYE: CPT

## 2023-04-27 NOTE — RESULT ENCOUNTER NOTE
Quirino,    MRI shows only mild degenerative changes and small disc herniations.  Would not recommend any surgical intervention.  Physical therapy will usually improve this.  If not responding to physical therapy, we could consider injections.    Have a nice day!    Dr. Da Silva

## 2023-05-16 ENCOUNTER — PATIENT OUTREACH (OUTPATIENT)
Dept: CARE COORDINATION | Facility: CLINIC | Age: 43
End: 2023-05-16
Payer: COMMERCIAL

## 2023-05-31 ENCOUNTER — PATIENT OUTREACH (OUTPATIENT)
Dept: CARE COORDINATION | Facility: CLINIC | Age: 43
End: 2023-05-31
Payer: COMMERCIAL

## 2023-08-19 ENCOUNTER — HEALTH MAINTENANCE LETTER (OUTPATIENT)
Age: 43
End: 2023-08-19

## 2024-08-25 ENCOUNTER — NURSE TRIAGE (OUTPATIENT)
Dept: NURSING | Facility: CLINIC | Age: 44
End: 2024-08-25

## 2024-08-26 NOTE — TELEPHONE ENCOUNTER
Nurse Triage SBAR    Is this a 2nd Level Triage? NO    Situation:  Chest tightness and possible palpations.    Background:  Per pt, [facility]. Per pt, his father had a heart attack at age 49 and he is concerned for any heart concerns and would like further work up for his heart health.    Assessment:  Pt denied any current chest pain, pt denied any current shortness of breath, pt denied any current distress, pt denied any current chest tightness . Per pt, his heart rate is 80 beats per minute [facility]    Protocol Recommended Disposition:   See PCP Within 24 Hours Pt is given protocol specific care advise, Call  [phone], seek ED care and call back indications. Pt is informed to go to urgent care if unable to get an appointment from clinic within 24 hours. Pt is warm transferred to  Jeri. Pt verbalized understanding and agreement with plan of care and no further questions at this time.       Recommendation:           Does the patient meet one of the following criteria for ADS visit consideration? 16+ years old, with an [facility] PCP     TIP  Providers, please consider if this condition is appropriate for management at one of our Acute and Diagnostic Services sites.     If patient is a good candidate, please use dotphrase <dot>triageresponse and select Refer to ADS to document.    Reason for Disposition   [1] Chest pain lasts < 5 minutes AND [2] NO chest pain or cardiac symptoms (e.g., breathing difficulty, sweating) now  (Exception: Chest pains that last only a few seconds.)    Additional Information   Negative: SEVERE difficulty breathing (e.g., struggling for each breath, speaks in single words)   Negative: Difficult to awaken or acting confused (e.g., disoriented, slurred speech)   Negative: Shock suspected (e.g., cold/pale/clammy skin, too weak to stand, low BP, rapid pulse)   Negative: Passed out  "(i.e., lost consciousness, collapsed and was not responding)   Negative: Chest pain lasting longer than 5 minutes and ANY of the following:    history of heart disease  (i.e., heart attack, bypass surgery, angina, angioplasty, CHF; not just a heart murmur)    described as crushing, pressure-like, or heavy    age > 50    age > 30 AND at least one cardiac risk factor (i.e., hypertension, diabetes, obesity, smoker or strong family history of heart disease)    not relieved with nitroglycerin   Negative: Heart beating < 50 beats per minute OR > 140 beats per minute   Negative: Visible sweat on face or sweat dripping down face   Negative: Sounds like a life-threatening emergency to the triager   Negative: SEVERE chest pain   Negative: [1] Chest pain (or \"angina\") comes and goes AND [2] is happening more often (increasing in frequency) or getting worse (increasing in severity)  (Exception: Chest pains that last only a few seconds.)   Negative: Pain also in shoulder(s) or arm(s) or jaw  (Exception: Pain is clearly made worse by movement.)   Negative: Difficulty breathing   Negative: Dizziness or lightheadedness   Negative: Coughing up blood   Negative: Cocaine use within last 3 days   Negative: Major surgery in past month   Negative: Hip or leg fracture (broken bone) in past month (or had cast on leg or ankle in past month)   Negative: Illness requiring prolonged bedrest in past month (e.g., immobilization, long hospital stay)   Negative: Long-distance travel in past month (e.g., car, bus, train, plane; with trip lasting 6 or more hours)   Negative: History of prior \"blood clot\" in leg or lungs (i.e., deep vein thrombosis, pulmonary embolism)   Negative: History of inherited increased risk of blood clots (e.g., Factor 5 Leiden, Anti-thrombin 3, Protein C or Protein S deficiency, Prothrombin mutation)   Negative: Cancer treatment in past six months (or has cancer now)   Negative: [1] Chest pain lasts > 5 minutes AND [2] " occurred in past 3 days (72 hours) (Exception: Feels exactly the same as previously diagnosed heartburn and has accompanying sour taste in mouth.)   Negative: Taking a deep breath makes pain worse   Negative: Patient sounds very sick or weak to the triager   Negative: [1] Chest pain lasts > 5 minutes AND [2] occurred > 3 days ago (72 hours) AND [3] NO chest pain or cardiac symptoms now    Protocols used: Chest Pain-A-AH

## 2024-10-12 ENCOUNTER — HEALTH MAINTENANCE LETTER (OUTPATIENT)
Age: 44
End: 2024-10-12